# Patient Record
Sex: FEMALE | Race: WHITE
[De-identification: names, ages, dates, MRNs, and addresses within clinical notes are randomized per-mention and may not be internally consistent; named-entity substitution may affect disease eponyms.]

---

## 2018-04-02 ENCOUNTER — HOSPITAL ENCOUNTER (EMERGENCY)
Dept: HOSPITAL 97 - ER | Age: 28
Discharge: HOME | End: 2018-04-02
Payer: SELF-PAY

## 2018-04-02 VITALS — DIASTOLIC BLOOD PRESSURE: 50 MMHG | SYSTOLIC BLOOD PRESSURE: 97 MMHG | OXYGEN SATURATION: 100 %
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
## 2018-04-02 DIAGNOSIS — S00.83XA: ICD-10-CM
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
## 2018-04-02 DIAGNOSIS — S20.20XA: Primary | ICD-10-CM
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
## 2018-04-02 PROCEDURE — 71250 CT THORAX DX C-: CPT

## 2018-04-02 PROCEDURE — 70450 CT HEAD/BRAIN W/O DYE: CPT

## 2018-04-02 PROCEDURE — 99284 EMERGENCY DEPT VISIT MOD MDM: CPT
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
## 2018-04-02 NOTE — RAD REPORT
EXAM DESCRIPTION:  CT - Thorax Wo Con

 

CLINICAL HISTORY:  Trauma to left lateral chest

 

COMPARISON:  None

 

FINDINGS:  The lungs are clear. No pleural thickening or pleural effusion. No pneumothorax.

 

No axillary, mediastinal or hilar adenopathy.

 

No displaced rib fracture seen. Evidence of old healed left-sided rib fractures is noted. No gross up
per abdominal finding.

 

All CT scans are performed using dose optimization technique as appropriate and may include automated
 exposure control or mA/KV adjustment according to patient size.

 

IMPRESSION:  No acute intrathoracic process is identified.
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
## 2018-11-05 ENCOUNTER — HOSPITAL ENCOUNTER (EMERGENCY)
Dept: HOSPITAL 97 - ER | Age: 28
LOS: 1 days | Discharge: HOME | End: 2018-11-06
Payer: SELF-PAY

## 2018-11-05 DIAGNOSIS — K59.00: Primary | ICD-10-CM

## 2018-11-05 DIAGNOSIS — F17.210: ICD-10-CM

## 2018-11-05 PROCEDURE — 96372 THER/PROPH/DIAG INJ SC/IM: CPT

## 2018-11-05 PROCEDURE — 74176 CT ABD & PELVIS W/O CONTRAST: CPT

## 2018-11-05 PROCEDURE — 76377 3D RENDER W/INTRP POSTPROCES: CPT

## 2018-11-05 PROCEDURE — 81003 URINALYSIS AUTO W/O SCOPE: CPT

## 2018-11-05 PROCEDURE — 99285 EMERGENCY DEPT VISIT HI MDM: CPT

## 2018-11-05 PROCEDURE — 81025 URINE PREGNANCY TEST: CPT

## 2018-11-06 VITALS — TEMPERATURE: 98.6 F | OXYGEN SATURATION: 100 %

## 2018-11-06 VITALS — DIASTOLIC BLOOD PRESSURE: 65 MMHG | SYSTOLIC BLOOD PRESSURE: 110 MMHG

## 2018-11-06 NOTE — RAD REPORT
EXAM DESCRIPTION:  CT - Stone Protocol - 11/6/2018 4:16 am

 

CLINICAL HISTORY:  Abdominal pain

 

 A preliminary report was provided at the time of the study and reviewed prior to final report.

 

COMPARISON:  None.

 

TECHNIQUE:  Axial 5 mm thick images were obtained without oral or IV contrast. The field-of-view span
s the entirety of the  system partially obscuring uppermost abdomen and lung bases.

 

All CT scans are performed using dose optimization technique as appropriate and may include automated
 exposure control or mA/KV adjustment according to patient size.

 

FINDINGS:  No hydronephrosis is present and no obstructing ureteral calculi. No suspicious renal mass
es. Isodense masses and pyelonephritis are not excluded on a stone protocol CT scan. No urinary bladd
er suspicious finding.

 

Imaged portions of the liver, spleen and pancreas show no suspicious findings on non-contrast imaging
. No gallbladder or biliary tree abnormality identified. No significant adrenal finding.

 

No suspicious bowel findings. Uterus and ovaries show no suspicious findings on noncontrast imaging. 
Large stool volume fills but does not distend the colon. No appendicitis findings.

 

No hernia, mass or bulky lymphadenopathy noted. No free air, abnormal free fluid or inflammatory stra
nding. Nonspecific inguinal lymph nodes are present.

 

No significant bony abnormality.

 

IMPRESSION:  Noncontrast CT abdomen and pelvis imaging shows no surgically emergent finding or worris
ome finding.

 

Large stool volume is present filling but not distending the colon. Stomach is filled but not dilated
 by fluid content.

 

Isodense masses and pyelonephritis are not excluded on stone protocol technique.Overall exam is limit
ed in the absence of oral and IV contrast.

## 2018-11-06 NOTE — EDPHYS
Physician Documentation                                                                           

 [facility]                                                                

Name: Jody Wyatt                                                                            

Age: 28 yrs                                                                                       

Sex: Female                                                                                       

: 1990                                                                                   

MRN: [id number]                                                                                   

Arrival Date: 2018                                                                          

Time: 23:33                                                                                       

Account#: [id number]                                                                            

Bed 24                                                                                            

Private MD:                                                                                       

Shabbir Sandoval                                                                      

HPI:                                                                                              

                                                                                             

00:07 This 28 yrs old  Female presents to ER via Ambulatory with complaints of       snw 

      Vaginal Pain.                                                                               

00:07 The patient presents with vaginal bleeding that is spotting. Onset: The                 snw 

      symptoms/episode began/occurred gradually, 3 month(s) ago, and became persistent.           

      Modifying factors: The symptoms are alleviated by nothing. Associated signs and             

      symptoms: Pertinent positives: bloating, abd pain. Severity of symptoms: At their worst     

      the symptoms were moderate. The patient's method of birth control includes tubal            

      ligation. The patient has experienced similar episodes in the past, multiple times. not     

      in 5 years.                                                                                 

                                                                                                  

OB/GYN:                                                                                           

                                                                                             

23:43 LMP 2018                                                                           tl3 

                                                                                                  

Historical:                                                                                       

- Allergies:                                                                                      

23:43 No Known Allergies;                                                                     tl3 

- PMHx:                                                                                           

23:43 Headaches;                                                                              tl3 

- PSHx:                                                                                           

23:43 rib fractures;                                                                          tl3 

                                                                                                  

- Immunization history:: Adult Immunizations up to date.                                          

- Social history:: Smoking status: Patient uses tobacco products, smokes one-half pack            

  cigarettes per day.                                                                             

- Ebola Screening: : Patient denies exposure to infectious person Patient denies travel           

  to an Ebola-affected area in the 21 days before illness onset No symptoms or risks              

  identified at this time.                                                                        

                                                                                                  

                                                                                                  

ROS:                                                                                              

                                                                                             

00:07 Constitutional: Negative for fever, chills, and weight loss, Eyes: Negative for injury, snw 

      pain, redness, and discharge, ENT: Negative for injury, pain, and discharge, Neck:          

      Negative for injury, pain, and swelling, Cardiovascular: Negative for chest pain,           

      palpitations, and edema, Respiratory: Negative for shortness of breath, cough,              

      wheezing, and pleuritic chest pain, Back: Negative for injury and pain, : Negative        

      for injury, bleeding, discharge, and swelling, MS/Extremity: Negative for injury and        

      deformity, Skin: Negative for injury, rash, and discoloration, Neuro: Negative for          

      headache, weakness, numbness, tingling, and seizure.                                        

      Abdomen/GI: Positive for abdominal pain, irregular cycles x 3 months.                       

                                                                                                  

Exam:                                                                                             

00:06 Constitutional:  This is a well developed, well nourished patient who is awake, alert,  snw 

      and in no acute distress. Head/Face:  Normocephalic, atraumatic. Eyes:  Pupils equal        

      round and reactive to light, extra-ocular motions intact.  Lids and lashes normal.          

      Conjunctiva and sclera are non-icteric and not injected.  Cornea within normal limits.      

      Periorbital areas with no swelling, redness, or edema. ENT:  Nares patent. No nasal         

      discharge, no septal abnormalities noted.  Tympanic membranes are normal and external       

      auditory canals are clear.  Oropharynx with no redness, swelling, or masses, exudates,      

      or evidence of obstruction, uvula midline.  Mucous membranes moist. Neck:  Trachea          

      midline, no thyromegaly or masses palpated, and no cervical lymphadenopathy.  Supple,       

      full range of motion without nuchal rigidity, or vertebral point tenderness.  No            

      Meningismus. Chest/axilla:  Normal chest wall appearance and motion.  Nontender with no     

      deformity.  No lesions are appreciated. Cardiovascular:  Regular rate and rhythm with a     

      normal S1 and S2.  No gallops, murmurs, or rubs.  Normal PMI, no JVD.  No pulse             

      deficits. Respiratory:  Lungs have equal breath sounds bilaterally, clear to                

      auscultation and percussion.  No rales, rhonchi or wheezes noted.  No increased work of     

      breathing, no retractions or nasal flaring. Back:  No spinal tenderness.  No                

      costovertebral tenderness.  Full range of motion. Skin:  Warm, dry with normal turgor.      

      Normal color with no rashes, no lesions, and no evidence of cellulitis. MS/ Extremity:      

      Pulses equal, no cyanosis.  Neurovascular intact.  Full, normal range of motion. Neuro:     

       Awake and alert, GCS 15, oriented to person, place, time, and situation.  Cranial          

      nerves II-XII grossly intact.  Motor strength 5/5 in all extremities.  Sensory grossly      

      intact.  Cerebellar exam normal.  Normal gait. Psych:  Awake, alert, with orientation       

      to person, place and time.  Behavior, mood, and affect are within normal limits.            

00:06 Abdomen/GI: Inspection: distension, Bowel sounds: normal, Palpation: mild abdominal         

      tenderness, in the suprapubic area and right lower quadrant.                                

                                                                                                  

Vital Signs:                                                                                      

                                                                                             

23:43  / 66; Pulse 89; Resp 18; Temp 98.6(O); Pulse Ox 100% on R/A; Weight 58.38 kg;    tl3 

      Height 5 ft. 2 in. (157.48 cm);                                                             

                                                                                             

01:30  / 65; Pulse 70; Resp 18; Pulse Ox 100% on R/A; Pain 0/10;                        mg2 

                                                                                             

23:43 Body Mass Index 23.54 (58.38 kg, 157.48 cm)                                             tl3 

                                                                                                  

MDM:                                                                                              

00:00 Patient medically screened.                                                             snw 

01:50 Data reviewed: vital signs, nurses notes. Data interpreted: Pulse oximetry: on room air snw 

      is 100 %. Interpretation: normal. Counseling: I had a detailed discussion with the          

      patient and/or guardian regarding: the historical points, exam findings, and any            

      diagnostic results supporting the discharge/admit diagnosis, lab results, radiology         

      results, the need for outpatient follow up, to return to the emergency department if        

      symptoms worsen or persist or if there are any questions or concerns that arise at          

      home. Special discussion: Based on the patient's Hx, exam, and Dx evaluation, there is      

      no indication for emergent surgery or inpatient Tx. It is understood by the                 

      patient/guardian that if the Sx's persist or worsen they need to return immediately for     

      re-evaluation. Based on the history and exam findings, there is no indication for           

      further emergent testing or inpatient evaluation. I discussed with the patient/guardian     

      the need to see the OB Gyne specialist for further evaluation of the symptoms. I            

      discussed with the patient/guardian the need to see the primary care provider for           

      further evaluation of the symptoms.                                                         

                                                                                                  

                                                                                             

00:05 Order name: Urine Dipstick--Ancillary (enter results); Complete Time: 00:35             ms  

                                                                                             

00:05 Order name: Urine Pregnancy--Ancillary (enter results); Complete Time: 00:35            ms  

                                                                                             

00:01 Order name: Urine Dipstick-Ancillary (obtain specimen); Complete Time: 00:05            snw 

                                                                                             

00:06 Order name: CT Stone Protocol                                                           snw 

                                                                                             

00:02 Order name: Urine Pregnancy Test (obtain specimen); Complete Time: 00:05                snw 

                                                                                                  

Administered Medications:                                                                         

00:12 Drug: TORadol 60 mg Route: IM; Site: right gluteus;                                     mg2 

01:11 Follow up: Response: No adverse reaction; Marked relief of symptoms                     mg2 

02:02 Not Given (Patient Refused): Miralax 17 grams PO once; mix into 4-8 oz. of any          mg2 

      hot/cold/room temp. beverage and drink immediately                                          

                                                                                                  

                                                                                                  

Disposition:                                                                                      

06:05 Co-signature as Attending Physician, Shabbir Guo MD I agree with the assessment and  [place] 

      plan of care.                                                                               

                                                                                                  

Disposition:                                                                                      

18 01:51 Discharged to Home. Impression: Lower abdominal pain, unspecified, Constipation.   

- Condition is Stable.                                                                            

- Discharge Instructions: Abdominal Pain, Adult, Constipation, Adult, High-Fiber Diet.            

- Prescriptions for Diclofenac Sodium 75 mg Oral Tablet Sustained Release - take 1                

  tablet by ORAL route 2 times per day; 30 tablet. Miralax 17 gram/dose Oral - take 1             

  packet by ORAL route once daily dilute powder in 8 ounces of water or juice; 1 box.             

- Work release form, Medication Reconciliation Form, Thank You Letter, Antibiotic                 

  Education, Prescription Opioid Use form.                                                        

- Follow up: Private Physician; When: 2 - 3 days; Reason: Recheck today's complaints,             

  Continuance of care, Re-evaluation by your physician. Follow up: Emergency                      

  Department; When: As needed; Reason: Worsening of condition.                                    

                                                                                                  

                                                                                                  

                                                                                                  

Signatures:                                                                                       

Dispatcher [employer]                           Shabbir Cunha MD MD cha Therrien, Shelly, FNP-C                 FNP-Csnw                                                  

Yudy Daniel, SALVATORE                       RN   tl3                                                  

Dragan Stephen RN                    RN   mg2                                                  

                                                                                                  

Corrections: (The following items were deleted from the chart)                                    

02:03 01:51 2018 01:51 Discharged to Home. Impression: Lower abdominal pain,            mg2 

      unspecified; Constipation. Condition is Stable. Forms are Medication Reconciliation         

      Form, Thank You Letter, Antibiotic Education, Prescription Opioid Use. Follow up:           

      Private Physician; When: 2 - 3 days; Reason: Recheck today's complaints, Continuance of     

      care, Re-evaluation by your physician. Follow up: Emergency Department; When: As            

      needed; Reason: Worsening of condition. snw                                                 

                                                                                                  

**************************************************************************************************

## 2018-11-06 NOTE — ER
Nurse's Notes                                                                                     

 [facility]                                                                

Name: Jody Wyatt                                                                            

Age: 28 yrs                                                                                       

Sex: Female                                                                                       

: 1990                                                                                   

MRN: [id number]                                                                                   

Arrival Date: 2018                                                                          

Time: 23:33                                                                                       

Account#: [id number]                                                                            

Bed 24                                                                                            

Private MD:                                                                                       

Diagnosis: Lower abdominal pain, unspecified;Constipation                                         

                                                                                                  

Presentation:                                                                                     

                                                                                             

23:40 Presenting complaint: Patient states: irregular periods for the last three months,      tl3 

      started cycle on the  with heavy clotting but now it is only spotting.       

      Transition of care: patient was not received from another setting of care. Onset of         

      symptoms was 2018. Risk Assessment: Do you want to hurt yourself or someone     

      else? Patient reports no desire to harm self or others. Initial Sepsis Screen: Does the     

      patient meet any 2 criteria? No. Patient's initial sepsis screen is negative. Does the      

      patient have a suspected source of infection? No. Patient's initial sepsis screen is        

      negative. Care prior to arrival: None.                                                      

23:40 Method Of Arrival: Ambulatory                                                           tl3 

23:40 Acuity: ARMIDA 5                                                                           tl3 

                                                                                                  

Triage Assessment:                                                                                

23:43 General: Appears in no apparent distress. comfortable, well groomed, well developed,    tl3 

      Behavior is calm, cooperative, appropriate for age. Pain: Complains of pain in right        

      lower quadrant Quality of pain is described as sharp, throbbing.                            

                                                                                                  

OB/GYN:                                                                                           

23:43 LMP 2018                                                                           tl3 

                                                                                                  

Historical:                                                                                       

- Allergies:                                                                                      

23:43 No Known Allergies;                                                                     tl3 

- PMHx:                                                                                           

23:43 Headaches;                                                                              tl3 

- PSHx:                                                                                           

23:43 rib fractures;                                                                          tl3 

                                                                                                  

- Immunization history:: Adult Immunizations up to date.                                          

- Social history:: Smoking status: Patient uses tobacco products, smokes one-half pack            

  cigarettes per day.                                                                             

- Ebola Screening: : Patient denies exposure to infectious person Patient denies travel           

  to an Ebola-affected area in the 21 days before illness onset No symptoms or risks              

  identified at this time.                                                                        

                                                                                                  

                                                                                                  

Screenin:54 Abuse screen: Denies threats or abuse. Denies injuries from another. Nutritional        mg2 

      screening: No deficits noted. Tuberculosis screening: No symptoms or risk factors           

      identified. Fall Risk None identified.                                                      

                                                                                                  

Assessment:                                                                                       

23:54 General: Appears in no apparent distress. comfortable, Behavior is calm, cooperative.   mg2 

      Pain: Complains of pain in abdomen and right lower quadrant Pain does not radiate. Pain     

      currently is 5 out of 10 on a pain scale. Quality of pain is described as aching, Pain      

      began gradually, 2-3 days ago. Is intermittent. Neuro: Level of Consciousness is awake,     

      alert, obeys commands, Oriented to person, place, time, situation. Neuro: Reports           

      headache. Cardiovascular: Capillary refill < 3 seconds Patient's skin is warm and dry.      

      Cardiovascular:. Respiratory: Airway is patent Respiratory effort is even, unlabored,       

      Respiratory pattern is regular, symmetrical. GI: Abdomen is round distended, Reports        

      constipation, nausea, vomiting, since 3 days ago. : Urine is clear. EENT: No deficits     

      noted. Derm: Skin is intact, is healthy with good turgor, Skin is pink, warm \T\ dry.       

      normal. Musculoskeletal: No signs and/or symptoms reported regarding the                    

      musculoskeletal system.                                                                     

                                                                                             

01:11 Reassessment: Patient appears in no apparent distress at this time. patient sent to ct  mg2 

      scan.                                                                                       

                                                                                                  

Vital Signs:                                                                                      

                                                                                             

23:43  / 66; Pulse 89; Resp 18; Temp 98.6(O); Pulse Ox 100% on R/A; Weight 58.38 kg;    tl3 

      Height 5 ft. 2 in. (157.48 cm);                                                             

                                                                                             

01:30  / 65; Pulse 70; Resp 18; Pulse Ox 100% on R/A; Pain 0/10;                        mg2 

                                                                                             

23:43 Body Mass Index 23.54 (58.38 kg, 157.48 cm)                                             tl3 

                                                                                                  

ED Course:                                                                                        

                                                                                             

23:33 Patient arrived in ED.                                                                  am2 

23:42 Triage completed.                                                                       tl3 

23:43 Arm band placed on left wrist.                                                          tl3 

23:53 Dragan Stephen, SALVATORE is Primary Nurse.                                                  mg2 

                                                                                             

00:00 Nancy Stroud FNP-C is PHCP.                                                        snw 

00:00 Shabbir Guo MD is Attending Physician.                                             snw 

00:03 Patient has correct armband on for positive identification. Bed in low position. Pulse  mg2 

      ox on. Sitter at bedside. Door closed. Warm blanket given.                                  

00:03 No provider procedures requiring assistance completed.                                  mg2 

00:58 Patient moved to CT via wheelchair.                                                     kw1 

01:10 CT Stone Protocol In Process Unspecified.                                               EDMS

01:11 CT completed. Patient tolerated procedure well. Patient moved back from CT.             kw1 

02:01 Patient did not have IV access during this emergency room visit.                        mg2 

                                                                                                  

Administered Medications:                                                                         

00:12 Drug: TORadol 60 mg Route: IM; Site: right gluteus;                                     mg2 

01:11 Follow up: Response: No adverse reaction; Marked relief of symptoms                     mg2 

02:02 Not Given (Patient Refused): Miralax 17 grams PO once; mix into 4-8 oz. of any          mg2 

      hot/cold/room temp. beverage and drink immediately                                          

                                                                                                  

                                                                                                  

Outcome:                                                                                          

01:51 Discharge ordered by MD. gustafson 

02:01 Discharged to home ambulatory, with friend.                                             mg2 

02:01 Condition: stable                                                                           

02:01 Discharge instructions given to patient, friend, Instructed on discharge instructions,      

      follow up and referral plans. medication usage, Demonstrated understanding of               

      instructions, follow-up care, medications, Prescriptions given X 2.                         

02:03 Patient left the ED.                                                                    mg2 

                                                                                                  

Signatures:                                                                                       

Dispatcher [employer]                           EDMS                                                 

Nancy Stroud, FNP-C                 FNP-Csnw                                                  

Tamika Stacy                               am2                                                  

Mireya Ho                            kw1                                                  

Yudy Daniel, SALVATORE                       RN   tl3                                                  

Dragan Stephen RN                    RN   mg2                                                  

                                                                                                  

**************************************************************************************************

## 2025-02-21 NOTE — P.OP
Assistant: NONE,NONE


Preoperative diagnosis: Septal deviation and inferior turbinate hypertrophy with

nasal obstruction


Postoperative diagnosis: Same


Primary procedure: Septoplasty


Secondary procedure: Turbinate reduction by submucosal cauterization


Anesthesia: General


Estimated blood loss: 10 mL


Specimen: Septal cartilage and bone


Findings: High right septal deviation, left septal spur, suspect prior nasal 

fracture


Operative Technique: 


Patient was brought to the operating room placed under general anesthesia via 

oral endotracheal tube.  The head of bed was turned 90 degrees.  The nasal hairs

were trimmed with scissors using a headlight and nasal speculum.  The patient 

was noted to have a mild anterior cartilaginous septal deviation with high right

severe septal deviation and a left septal spur.  The septum was injected with 

local anesthetic and the nasal cavity was packed with Afrin-soaked pledgets.  

The patient was prepped and draped in a standard fashion for nasal surgery.  The

Afrin-soaked pledgets were removed and a right hemitransfixion was made through 

the septal mucosa.  A caudal elevator and scalpel was used to elevate the right 

mucoperichondrial flap.  The cartilaginous-bony junction was identified and 

carefully navigated elevating the mucosa over the more posterior bony septum on 

the right.


The left perichondrial flap was then elevated but was somewhat difficult with 

significant scarring and adherence with suspicion for prior nasal injury.  Once 

the flap was elevated, a incision was made through the cartilage using a scalpel

with care taken to preserve at least 1 cm L strut of the cartilage.  The 

cartilage was carefully loosened from its inferior and posterior attachments and

was removed using a Marcia forcep.  A Hampton rongeur was then used to 

judiciously removed the portions of the right deviated high bony septum.  This 

significantly improved the width of the right internal nasal valve.


A long nasal speculum was then used for better visualization of the septal spur 

which was removed using the Hampton rongeurs.  Overall this resulted in 

significant improvement of the bilateral nasal airway.


The mucoperichondrial flaps were approximated to the remaining cartilaginous 

septum using a quilting suture on a's small straight Greg needle.  The right 

hemitransfixion incision was closed in a running fashion using resorbable 

sutures.





The unprotected needlepoint Bovie was used to perform bilateral inferior 

turbinate reduction via submucosal cauterization.  Starting on the left, the 

cautery was buried into 3 locations starting from the superior and going to the 

inferior aspect of the inferior turbinate.  The cauterization was activated for 

approximately 10 seconds at each location to provide cauterization and tissue 

reduction in these areas.  Bleeding was minimal.  The procedure was repeated on 

the right side.  The cautery was buried in 3 locations starting from the 

superior and going to the inferior aspect of the inferior turbinate.  The 

cauterization was activated for approximately 10 seconds at each location to 

provide cauterization and tissue reduction.





Aguilar splints were applied to the nasal cavity with modifications of the left 

via removal of the airway the with scissors.  The splints were secured to the 

anterior nasal septum using a single 4-0 nylon suture.  There was minimal 

bleeding noted and the stomach was evacuated using an orogastric tube.  A small 

amount of additional blood was suctioned from the oropharynx using a Yankauer 

suction.  The patient was then returned to care of anesthesia for awakening 

extubation in the operating room which proceeded without difficulty.





Disposition: Patient will be discharged home and follow up with Dr. Montague in 

10 days for removal of splints.





Complications: None


Implants: Aguilar splints bilateral nasal cavity


Fluids & blood products: See anesthesia record


Transferred to: Recovery Room


Condition: Good